# Patient Record
Sex: MALE | Race: BLACK OR AFRICAN AMERICAN | Employment: FULL TIME | URBAN - METROPOLITAN AREA
[De-identification: names, ages, dates, MRNs, and addresses within clinical notes are randomized per-mention and may not be internally consistent; named-entity substitution may affect disease eponyms.]

---

## 2020-09-18 ENCOUNTER — APPOINTMENT (OUTPATIENT)
Dept: GENERAL RADIOLOGY | Age: 40
End: 2020-09-18
Attending: EMERGENCY MEDICINE

## 2020-09-18 ENCOUNTER — HOSPITAL ENCOUNTER (EMERGENCY)
Age: 40
Discharge: HOME | End: 2020-09-18
Attending: EMERGENCY MEDICINE

## 2020-09-18 VITALS
RESPIRATION RATE: 16 BRPM | WEIGHT: 178 LBS | SYSTOLIC BLOOD PRESSURE: 146 MMHG | TEMPERATURE: 98.9 F | OXYGEN SATURATION: 99 % | HEART RATE: 84 BPM | BODY MASS INDEX: 27.94 KG/M2 | HEIGHT: 67 IN | DIASTOLIC BLOOD PRESSURE: 87 MMHG

## 2020-09-18 DIAGNOSIS — M25.562 ACUTE PAIN OF LEFT KNEE: Primary | ICD-10-CM

## 2020-09-18 PROCEDURE — 99284 EMERGENCY DEPT VISIT MOD MDM: CPT

## 2020-09-18 PROCEDURE — 73562 X-RAY EXAM OF KNEE 3: CPT

## 2020-09-18 RX ORDER — DICLOFENAC POTASSIUM 50 MG/1
50 TABLET, FILM COATED ORAL
Qty: 30 TAB | Refills: 0 | Status: SHIPPED | OUTPATIENT
Start: 2020-09-18 | End: 2020-10-12

## 2020-09-18 NOTE — DISCHARGE INSTRUCTIONS
Patient Education        Knee Pain or Injury: Care Instructions  Your Care Instructions     Injuries are a common cause of knee problems. Sudden (acute) injuries may be caused by a direct blow to the knee. They can also be caused by abnormal twisting, bending, or falling on the knee. Pain, bruising, or swelling may be severe, and may start within minutes of the injury. Overuse is another cause of knee pain. Other causes are climbing stairs, kneeling, and other activities that use the knee. Everyday wear and tear, especially as you get older, also can cause knee pain. Rest, along with home treatment, often relieves pain and allows your knee to heal. If you have a serious knee injury, you may need tests and treatment. Follow-up care is a key part of your treatment and safety. Be sure to make and go to all appointments, and call your doctor if you are having problems. It's also a good idea to know your test results and keep a list of the medicines you take. How can you care for yourself at home? · Be safe with medicines. Read and follow all instructions on the label. ? If the doctor gave you a prescription medicine for pain, take it as prescribed. ? If you are not taking a prescription pain medicine, ask your doctor if you can take an over-the-counter medicine. · Rest and protect your knee. Take a break from any activity that may cause pain. · Put ice or a cold pack on your knee for 10 to 20 minutes at a time. Put a thin cloth between the ice and your skin. · Prop up a sore knee on a pillow when you ice it or anytime you sit or lie down for the next 3 days. Try to keep it above the level of your heart. This will help reduce swelling. · If your knee is not swollen, you can put moist heat, a heating pad, or a warm cloth on your knee. · If your doctor recommends an elastic bandage, sleeve, or other type of support for your knee, wear it as directed.   · Follow your doctor's instructions about how much weight you can put on your leg. Use a cane, crutches, or a walker as instructed. · Follow your doctor's instructions about activity during your healing process. If you can do mild exercise, slowly increase your activity. · Reach and stay at a healthy weight. Extra weight can strain the joints, especially the knees and hips, and make the pain worse. Losing even a few pounds may help. When should you call for help? Call 911 anytime you think you may need emergency care. For example, call if:    · You have symptoms of a blood clot in your lung (called a pulmonary embolism). These may include:  ? Sudden chest pain. ? Trouble breathing. ? Coughing up blood. Call your doctor now or seek immediate medical care if:    · You have severe or increasing pain.     · Your leg or foot turns cold or changes color.     · You cannot stand or put weight on your knee.     · Your knee looks twisted or bent out of shape.     · You cannot move your knee.     · You have signs of infection, such as:  ? Increased pain, swelling, warmth, or redness. ? Red streaks leading from the knee. ? Pus draining from a place on your knee. ? A fever.     · You have signs of a blood clot in your leg (called a deep vein thrombosis), such as:  ? Pain in your calf, back of the knee, thigh, or groin. ? Redness and swelling in your leg or groin. Watch closely for changes in your health, and be sure to contact your doctor if:    · You have tingling, weakness, or numbness in your knee.     · You have any new symptoms, such as swelling.     · You have bruises from a knee injury that last longer than 2 weeks.     · You do not get better as expected. Where can you learn more? Go to http://rudolph-sukh.info/  Enter K195 in the search box to learn more about \"Knee Pain or Injury: Care Instructions. \"  Current as of: June 26, 2019               Content Version: 12.6  © 8405-9008 [employer].    Care instructions adapted under license by [facility] [employer] (which disclaims liability or warranty for this information). If you have questions about a medical condition or this instruction, always ask your healthcare professional. [address] any warranty or liability for your use of this information. Patient Education   Patient Education        Learning About RICE (Rest, Ice, Compression, and Elevation)  What is RICE? RICE is a way to care for an injury. RICE helps relieve pain and swelling. It may also help with healing and flexibility. RICE stands for:  · R est and protect the injured or sore area. · I ce or a cold pack used as soon as possible. · C ompression, or wrapping the injured or sore area with an elastic bandage. · E levation (propping up) the injured or sore area. How do you do RICE? You can use RICE for home treatment when you have general aches and pains or after an injury or surgery. Rest  · Do not put weight on the injury for at least 24 to 48 hours. · Use crutches for a badly sprained knee or ankle. · Support a sprained wrist, elbow, or shoulder with a sling. Ice  · Put ice or a cold pack on the injury right away to reduce pain and swelling. Frozen vegetables will also work as an ice pack. Put a thin cloth between the ice or cold pack and your skin. The cloth protects the injured area from getting too cold. · Use ice for 10 to 15 minutes at a time for the first 48 to 72 hours. Compression  · Use compression for sprains, strains, and surgeries of the arms and legs. · Wrap the injured area with an elastic bandage or compression sleeve to reduce swelling. · Don't wrap it too tightly. If the area below it feels numb, tingles, or feels cool, loosen the wrap. Elevation  · Use elevation for areas of the body that can be propped up, such as arms and legs. · Prop up the injured area on pillows whenever you use ice. Keep it propped up anytime you sit or lie down.   · Try to keep the injured area at or above the level of your heart. This will help reduce swelling and bruising. Where can you learn more? Go to http://rudolph-sukh.info/  Enter I463 in the search box to learn more about \"Learning About RICE (Rest, Ice, Compression, and Elevation). \"  Current as of: March 2, 2020               Content Version: 12.6  © 2070-9797 [employer]. Care instructions adapted under license by [employer] (which disclaims liability or warranty for this information). If you have questions about a medical condition or this instruction, always ask your healthcare professional. [place] [address] any warranty or liability for your use of this information. Knee: Exercises  Introduction  Here are some examples of exercises for you to try. The exercises may be suggested for a condition or for rehabilitation. Start each exercise slowly. Ease off the exercises if you start to have pain. You will be told when to start these exercises and which ones will work best for you. How to do the exercises  Quad sets   1. Sit with your leg straight and supported on the floor or a firm bed. (If you feel discomfort in the front or back of your knee, place a small towel roll under your knee.)  2. Tighten the muscles on top of your thigh by pressing the back of your knee flat down to the floor. (If you feel discomfort under your kneecap, place a small towel roll under your knee.)  3. Hold for about 6 seconds, then rest for up to 10 seconds. 4. Do 8 to 12 repetitions several times a day. Straight-leg raises to the front   1. Lie on your back with your good knee bent so that your foot rests flat on the floor. Your injured leg should be straight. Make sure that your low back has a normal curve.  You should be able to slip your flat hand in between the floor and the small of your back, with your palm touching the floor and your back touching the back of your hand.  2. Tighten the thigh muscles in the injured leg by pressing the back of your knee flat down to the floor. Hold your knee straight. 3. Keeping the thigh muscles tight, lift your injured leg up so that your heel is about 12 inches off the floor. Hold for about 6 seconds and then lower slowly. 4. Do 8 to 12 repetitions, 3 times a day. Straight-leg raises to the outside   1. Lie on your side, with your injured leg on top. 2. Tighten the front thigh muscles of your injured leg to keep your knee straight. 3. Keep your hip and your leg straight in line with the rest of your body, and keep your knee pointing forward. Do not drop your hip back. 4. Lift your injured leg straight up toward the ceiling, about 12 inches off the floor. Hold for about 6 seconds, then slowly lower your leg. 5. Do 8 to 12 repetitions. Straight-leg raises to the back   1. Lie on your stomach, and lift your leg straight up behind you (toward the ceiling). 2. Lift your toes about 6 inches off the floor, hold for about 6 seconds, then lower slowly. 3. Do 8 to 12 repetitions. Straight-leg raises to the inside   1. Lie on the side of your body with the injured leg. 2. You can either prop your other (good) leg up on a chair, or you can bend your good knee and put that foot in front of your injured knee. Do not drop your hip back. 3. Tighten the muscles on the front of your thigh to straighten your injured knee. 4. Keep your kneecap pointing forward, and lift your whole leg up toward the ceiling about 6 inches. Hold for about 6 seconds, then lower slowly. 5. Do 8 to 12 repetitions. Heel dig bridging   1. Lie on your back with both knees bent and your ankles bent so that only your heels are digging into the floor. Your knees should be bent about 90 degrees. 2. Then push your heels into the floor, squeeze your buttocks, and lift your hips off the floor until your shoulders, hips, and knees are all in a straight line.   3. Hold for about 6 seconds as you continue to breathe normally, and then slowly lower your hips back down to the floor and rest for up to 10 seconds. 4. Do 8 to 12 repetitions. Hamstring curls   1. Lie on your stomach with your knees straight. If your kneecap is uncomfortable, roll up a washcloth and put it under your leg just above your kneecap. 2. Lift the foot of your injured leg by bending the knee so that you bring the foot up toward your buttock. If this motion hurts, try it without bending your knee quite as far. This may help you avoid any painful motion. 3. Slowly lower your leg back to the floor. 4. Do 8 to 12 repetitions. 5. With permission from your doctor or physical therapist, you may also want to add a cuff weight to your ankle (not more than 5 pounds). With weight, you do not have to lift your leg more than 12 inches to get a hamstring workout. Shallow standing knee bends   Do this exercise only if you have very little pain; if you have no clicking, locking, or giving way if you have an injured knee; and if it does not hurt while you are doing 8 to 12 repetitions. 1. Stand with your hands lightly resting on a counter or chair in front of you. Put your feet shoulder-width apart. 2. Slowly bend your knees so that you squat down like you are going to sit in a chair. Make sure your knees do not go in front of your toes. 3. Lower yourself about 6 inches. Your heels should remain on the floor at all times. 4. Rise slowly to a standing position. Heel raises   1. Stand with your feet a few inches apart, with your hands lightly resting on a counter or chair in front of you. 2. Slowly raise your heels off the floor while keeping your knees straight. 3. Hold for about 6 seconds, then slowly lower your heels to the floor. 4. Do 8 to 12 repetitions several times during the day. Follow-up care is a key part of your treatment and safety.  Be sure to make and go to all appointments, and call your doctor if you are having problems. It's also a good idea to know your test results and keep a list of the medicines you take. Where can you learn more? Go to http://www.gray.com/  Enter U790 in the search box to learn more about \"Knee: Exercises. \"  Current as of: March 2, 2020               Content Version: 12.6  © 0523-8513 [employer]. Care instructions adapted under license by [employer] (which disclaims liability or warranty for this information). If you have questions about a medical condition or this instruction, always ask your healthcare professional. [address] any warranty or liability for your use of this information.

## 2020-09-18 NOTE — ED PROVIDER NOTES
PIT  36 M  Fell yesterday  Left knee pain  Prior scope to l knee - lig damage    No other injuries    PE  AA&O  ttp anteriorly  Minimal joint effusion  Ambulatory with discomfort    Will check xr    Further care to be completed in main ER by another provider. Signed By: Keshav Kwok MD    September 18, 2020   Patient states he was coming down the stairs yesterday that were wet and slipped and \"tweaked my knee. \"  He injured it years ago playing basketball. He is able to ambulate on it. There is minimal swelling. He has no pain above or below the knee. He just started a new job on Wednesday doing [employer]. He has no chest pain, shortness of breath, abdominal pain, dizziness, weakness, dyspnea on exertion, orthopnea, trouble with urination or bowel movements or other new symptoms. No fever. He did ambulate to the stretcher with an antalgic gait due to pain. The history is provided by the patient. Knee Pain    This is a recurrent problem. The current episode started yesterday. The problem occurs constantly. The problem has not changed since onset. The pain is present in the left knee. The quality of the pain is described as aching. The pain is at a severity of 8/10. The pain is moderate. Pertinent negatives include no numbness, full range of motion, no stiffness, no tingling, no itching, no back pain and no neck pain. The symptoms are aggravated by movement and activity. He has tried nothing for the symptoms. There has been a history of trauma. No past medical history on file. No past surgical history on file. No family history on file.     Social History     Socioeconomic History    Marital status: Not on file     Spouse name: Not on file    Number of children: Not on file    Years of education: Not on file    Highest education level: Not on file   Occupational History    Not on file   Social Needs    Financial resource strain: Not on file    Food insecurity     Worry: Not on file Inability: Not on file    Transportation needs     Medical: Not on file     Non-medical: Not on file   Tobacco Use    Smoking status: Not on file   Substance and Sexual Activity    Alcohol use: Not on file    Drug use: Not on file    Sexual activity: Not on file   Lifestyle    Physical activity     Days per week: Not on file     Minutes per session: Not on file    Stress: Not on file   Relationships    Social connections     Talks on phone: Not on file     Gets together: Not on file     Attends Alevism service: Not on file     Active member of club or organization: Not on file     Attends meetings of clubs or organizations: Not on file     Relationship status: Not on file    Intimate partner violence     Fear of current or ex partner: Not on file     Emotionally abused: Not on file     Physically abused: Not on file     Forced sexual activity: Not on file   Other Topics Concern    Not on file   Social History Narrative    Not on file         ALLERGIES: Patient has no allergy information on record. Review of Systems   Constitutional: Negative. HENT: Negative. Eyes: Negative. Respiratory: Negative. Cardiovascular: Negative. Gastrointestinal: Negative. Genitourinary: Negative. Musculoskeletal: Negative. Negative for back pain, neck pain and stiffness. Left knee pain   Skin: Negative. Negative for itching. Neurological: Negative. Negative for tingling and numbness. Psychiatric/Behavioral: Negative. All other systems reviewed and are negative. There were no vitals filed for this visit. Physical Exam  Vitals signs and nursing note reviewed. Constitutional:       Appearance: He is well-developed. HENT:      Head: Normocephalic and atraumatic. Right Ear: External ear normal.      Left Ear: External ear normal.      Nose: Nose normal.   Eyes:      Conjunctiva/sclera: Conjunctivae normal.      Pupils: Pupils are equal, round, and reactive to light. Neck:      Musculoskeletal: Normal range of motion and neck supple. Cardiovascular:      Rate and Rhythm: Normal rate and regular rhythm. Heart sounds: Normal heart sounds. Pulmonary:      Effort: Pulmonary effort is normal.      Breath sounds: Normal breath sounds. Abdominal:      General: Bowel sounds are normal.      Palpations: Abdomen is soft. Musculoskeletal: Normal range of motion. Legs:    Skin:     General: Skin is warm and dry. Neurological:      Mental Status: He is alert and oriented to person, place, and time. Deep Tendon Reflexes: Reflexes are normal and symmetric. Psychiatric:         Behavior: Behavior normal.         Thought Content: Thought content normal.         Judgment: Judgment normal.          MDM  Number of Diagnoses or Management Options  Acute pain of left knee:      Amount and/or Complexity of Data Reviewed  Tests in the radiology section of CPT®: reviewed    Risk of Complications, Morbidity, and/or Mortality  Presenting problems: moderate  Diagnostic procedures: moderate  Management options: moderate    Patient Progress  Patient progress: stable         Procedures        The patient was observed in the ED. Results Reviewed:  XR KNEE LT 3 V   Final Result   IMPRESSION:   1. No acute osseous abnormality suggested. However, there is a moderate to large   joint effusion multiple more chronic appearing changes as described above some   of which may have resulted from a prior injury. If there is continued concern   for occult osseous injury or potential soft tissue injury such as ligamentous   injury, then a nonemergent noncontrast MRI can be considered. Patient does not have an appreciable amount of fluid to drain today. I will refer him to orthopedics today. We have applied a knee immobilizer and crutches to help him ambulate. I have written for [employer] for inflammation and pain.   I have written a note out of work until he can see the orthopedic. He does have an old basketball injury. Rest, ice, elevate, avoid painful activities. ED if worse. Follow up with Ortho for recheck. He is stable for discharge and ambulatory out of the ER with the crutches without difficulty at this time. I discussed the results of all labs, procedures, radiographs, and treatments with the patient and available family. Treatment plan is agreed upon and the patient is ready for discharge. All voiced understanding of the discharge plan and medication instructions or changes as appropriate. Questions about treatment in the ED were answered. All were encouraged to return should symptoms worsen or new problems develop.

## 2020-09-18 NOTE — ED TRIAGE NOTES
Pt reports left knee pain and swelling after hurting it walking down the stairs. Swelling to left knee noted to be swollen in triage. Pt ambulatory to triage with slight limp. Denies any other injuries or trauma. NAd. Masked.

## 2020-09-18 NOTE — ED NOTES
I have reviewed discharge instructions with the patient and spouse. The patient and spouse verbalized understanding. Patient left ED via Discharge Method: ambulatory to Home with spouse    Opportunity for questions and clarification provided. Patient given 1 scripts. To continue your aftercare when you leave the hospital, you may receive an automated call from our care team to check in on how you are doing. This is a free service and part of our promise to provide the best care and service to meet your aftercare needs.  If you have questions, or wish to unsubscribe from this service please call [phone]. Thank you for Choosing our [facility] Emergency Department.

## 2020-09-18 NOTE — LETTER
[address] [facility] EMERGENCY DEPT 
[address] [facility] [place] [address] 
[phone] Work/School Note Date: 9/18/2020 To Whom It May concern: 
 
Noman Campuzano was seen and treated today in the emergency room by the following provider(s): 
Attending Provider: Yusra Aden MD 
Physician Assistant: REED Dhillon. Noman Campuzano may return to work after visit with Ortho. Sincerely, REED Salter

## 2020-09-18 NOTE — LETTER
[facility] EMERGENCY DEPT 
[address] [facility] [address] [facility] [address] 
[phone] Work/School Note Date: 9/18/2020 To Whom It May concern: 
 
Brenda Rockwell was seen and treated today in the emergency room by the following provider(s): 
Attending Provider: Charo Barnes MD 
Physician Assistant: REED Hanson. Brenda Rockwell may return to work on 09/21/20. Sincerely, REED Garcia

## 2020-10-12 ENCOUNTER — HOSPITAL ENCOUNTER (EMERGENCY)
Age: 40
Discharge: HOME | End: 2020-10-12
Attending: EMERGENCY MEDICINE

## 2020-10-12 VITALS
HEIGHT: 68 IN | RESPIRATION RATE: 16 BRPM | TEMPERATURE: 98.4 F | WEIGHT: 185 LBS | DIASTOLIC BLOOD PRESSURE: 89 MMHG | SYSTOLIC BLOOD PRESSURE: 144 MMHG | HEART RATE: 75 BPM | BODY MASS INDEX: 28.04 KG/M2 | OXYGEN SATURATION: 98 %

## 2020-10-12 DIAGNOSIS — A64 STI (SEXUALLY TRANSMITTED INFECTION): Primary | ICD-10-CM

## 2020-10-12 PROCEDURE — 99283 EMERGENCY DEPT VISIT LOW MDM: CPT

## 2020-10-12 PROCEDURE — 87491 CHLMYD TRACH DNA AMP PROBE: CPT

## 2020-10-12 PROCEDURE — 74011250637 HC RX REV CODE- 250/637: Performed by: EMERGENCY MEDICINE

## 2020-10-12 RX ORDER — METRONIDAZOLE 500 MG/1
2000 TABLET ORAL
Status: COMPLETED | OUTPATIENT
Start: 2020-10-12 | End: 2020-10-12

## 2020-10-12 RX ADMIN — METRONIDAZOLE 2000 MG: 500 TABLET, FILM COATED ORAL at 14:41

## 2020-10-12 NOTE — DISCHARGE INSTRUCTIONS
No sexual intercourse for 14 days  Do not consume alcohol for 7 days    Recheck with health department    Return to ER for any worsening symptoms or new problems which may arise

## 2020-10-12 NOTE — ED NOTES
I have reviewed discharge instructions with the patient. The patient verbalized understanding. Patient left ED via Discharge Method: ambulatory to Home with (insert name of family/friend, self, transport self). Opportunity for questions and clarification provided. Patient given 0 scripts. To continue your aftercare when you leave the hospital, you may receive an automated call from our care team to check in on how you are doing. This is a free service and part of our promise to provide the best care and service to meet your aftercare needs.  If you have questions, or wish to unsubscribe from this service please call [phone]. Thank you for Choosing our [facility] Emergency Department.

## 2020-10-12 NOTE — ED PROVIDER NOTES
26-year-old male presents with concerns over trichomoniasis. States that his fiancée was told by her clinic that she tested positive for trichomoniasis. He states there were no other infections  Patient has been asymptomatic    The history is provided by the patient. Exposure to STD   The incident occurred more than 1 week ago. The sexual encounter was with the patient's spouse. Pertinent negatives include no loss of consciousness, no nausea, no vomiting, no abdominal pain, no penile pain and no penile discharge. There has been no physical assault. There is a concern regarding sexually transmitted diseases. There is no HIV concern. He has tried nothing for the symptoms. History reviewed. No pertinent past medical history. Past Surgical History:   Procedure Laterality Date    HX ORTHOPAEDIC           History reviewed. No pertinent family history. Social History     Socioeconomic History    Marital status: SINGLE     Spouse name: Not on file    Number of children: Not on file    Years of education: Not on file    Highest education level: Not on file   Occupational History    Not on file   Social Needs    Financial resource strain: Not on file    Food insecurity     Worry: Not on file     Inability: Not on file    Transportation needs     Medical: Not on file     Non-medical: Not on file   Tobacco Use    Smoking status: Current Every Day Smoker     Packs/day: 0.50    Smokeless tobacco: Never Used   Substance and Sexual Activity    Alcohol use:  Yes    Drug use: Never    Sexual activity: Yes     Partners: Female   Lifestyle    Physical activity     Days per week: Not on file     Minutes per session: Not on file    Stress: Not on file   Relationships    Social connections     Talks on phone: Not on file     Gets together: Not on file     Attends Tenri[employer] service: Not on file     Active member of club or organization: Not on file     Attends meetings of clubs or organizations: Not on file Relationship status: Not on file    Intimate partner violence     Fear of current or ex partner: Not on file     Emotionally abused: Not on file     Physically abused: Not on file     Forced sexual activity: Not on file   Other Topics Concern    Not on file   Social History Narrative    Not on file         ALLERGIES: Patient has no known allergies. Review of Systems   Constitutional: Negative for activity change, chills, diaphoresis and fever. HENT: Negative for dental problem, hearing loss, nosebleeds, rhinorrhea and sore throat. Eyes: Negative for pain, discharge, redness and visual disturbance. Respiratory: Negative for cough, chest tightness and shortness of breath. Cardiovascular: Negative for chest pain, palpitations and leg swelling. Gastrointestinal: Negative for abdominal pain, constipation, diarrhea, nausea and vomiting. Endocrine: Negative for cold intolerance, heat intolerance, polydipsia and polyuria. Genitourinary: Negative for dysuria, flank pain, penile discharge and penile pain. Musculoskeletal: Negative for arthralgias, back pain, joint swelling, myalgias and neck pain. Skin: Negative for pallor and rash. Allergic/Immunologic: Negative for environmental allergies and food allergies. Neurological: Negative for dizziness, tremors, loss of consciousness, light-headedness, numbness and headaches. Hematological: Negative for adenopathy. Does not bruise/bleed easily. Psychiatric/Behavioral: Negative for confusion and dysphoric mood. The patient is not nervous/anxious and is not hyperactive. All other systems reviewed and are negative. Vitals:    10/12/20 1408   BP: (!) 144/89   Pulse: 75   Resp: 16   Temp: 98.4 °F (36.9 °C)   SpO2: 98%   Weight: 83.9 kg (185 lb)   Height: 5' 8\" (1.727 m)            Physical Exam  Vitals signs and nursing note reviewed. Constitutional:       General: He is in acute distress. Appearance: Normal appearance.  He is well-developed and normal weight. HENT:      Head: Normocephalic and atraumatic. Right Ear: External ear normal.      Left Ear: External ear normal.      Mouth/Throat:      Pharynx: No oropharyngeal exudate. Eyes:      General: No scleral icterus. Extraocular Movements: Extraocular movements intact. Conjunctiva/sclera: Conjunctivae normal.      Pupils: Pupils are equal, round, and reactive to light. Neck:      Musculoskeletal: Normal range of motion and neck supple. Thyroid: No thyromegaly. Vascular: No JVD. Cardiovascular:      Rate and Rhythm: Normal rate and regular rhythm. Heart sounds: Normal heart sounds. No murmur. No friction rub. No gallop. Pulmonary:      Effort: Pulmonary effort is normal. No respiratory distress. Breath sounds: Normal breath sounds. No wheezing. Abdominal:      General: Bowel sounds are normal. There is no distension. Palpations: Abdomen is soft. Tenderness: There is no abdominal tenderness. Musculoskeletal: Normal range of motion. General: No tenderness or deformity. Skin:     General: Skin is warm and dry. Capillary Refill: Capillary refill takes less than 2 seconds. Findings: No rash. Neurological:      General: No focal deficit present. Mental Status: He is alert and oriented to person, place, and time. Cranial Nerves: No cranial nerve deficit. Sensory: No sensory deficit. Motor: No abnormal muscle tone. Coordination: Coordination normal.   Psychiatric:         Mood and Affect: Mood normal.         Behavior: Behavior normal.         Thought Content: Thought content normal.         Judgment: Judgment normal.          MDM  Number of Diagnoses or Management Options  STI (sexually transmitted infection): new and does not require workup  Diagnosis management comments:  We will treat patient with Flagyl    Explained one-time dosing  Explained need for cessation from sexual intercourse and to avoid alcohol       Amount and/or Complexity of Data Reviewed  Tests in the medicine section of CPT®: reviewed and ordered  Review and summarize past medical records: yes    Risk of Complications, Morbidity, and/or Mortality  Presenting problems: low  Diagnostic procedures: minimal  Management options: minimal  General comments: Elements of this note have been dictated via voice recognition software. Text and phrases may be limited by the accuracy of the software. The chart has been reviewed, but errors may still be present.       Patient Progress  Patient progress: stable         Procedures

## 2020-10-12 NOTE — ED TRIAGE NOTES
Pt states his girlfriend tested positive for trich. Pt denies any pain during urination, denies any penile discharge. Pt masked upon arrival to the ED.

## 2020-10-14 LAB
C TRACH RRNA SPEC QL NAA+PROBE: NEGATIVE
N GONORRHOEA RRNA SPEC QL NAA+PROBE: NEGATIVE
SPECIMEN SOURCE: NORMAL